# Patient Record
Sex: MALE | Race: WHITE | Employment: PART TIME | URBAN - METROPOLITAN AREA
[De-identification: names, ages, dates, MRNs, and addresses within clinical notes are randomized per-mention and may not be internally consistent; named-entity substitution may affect disease eponyms.]

---
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
## 2020-02-02 ENCOUNTER — APPOINTMENT (OUTPATIENT)
Dept: URGENT CARE | Age: 20
End: 2020-02-02

## (undated) NOTE — ED AVS SNAPSHOT
[facility] in [address] [place] [address]    Phone:  [phone]    Fax:  [phone]           Babatunde Henderson   MRN: [id number]    Department:  [facility] in [place]   Date of Visit IF THERE IS ANY CHANGE OR WORSENING OF YOUR CONDITION, CALL YOUR PRIMARY CARE PHYSICIAN AT ONCE OR RETURN IMMEDIATELY TO THE EMERGENCY DEPARTMENT.     If you have been prescribed any medication(s), please fill your prescription right away and begin taking t

## (undated) NOTE — ED AVS SNAPSHOT
[facility] in [address] [facility]    Phone:  [phone]    Fax:  [phone]           Ora Dejesus   MRN: [id number]    Department:  [facility] in [place]   Date of Visit Pediatric [phone] Emergency Department   [phone]       To Check ER Wait Times:  TEXT 'ERwait' to 13266      Click [email or web address]      Or call [phone]    If you have any problems with your follow-up, please call our case [facility] [place] before you leave. After you leave, you should follow the attached instructions. I have read and understand the instructions given to me by my caregivers. [facility] Pharm[facility]        Pharmacy Address Phone Number   [phone] Final result    Impression:    CONCLUSION:  No acute findings.            Dictated by: Juana Celestin MD on 3/14/2017 at 15:08       Approved by: Juana Celestin MD              Narrative:    PROCEDURE:  XR WRIST COMPLETE (MIN 3 VIEWS), LEFT (CPT=73110)

## (undated) NOTE — LETTER
March 14, 2017    Patient: Isaac Siemens   Date of Visit: 3/14/2017       To Whom It May Concern:    Eileen Wu was seen and treated in our emergency department on 3/14/2017. He should not participate in gym/sports until clear by a physician.